# Patient Record
Sex: MALE | Race: WHITE | Employment: FULL TIME | URBAN - METROPOLITAN AREA
[De-identification: names, ages, dates, MRNs, and addresses within clinical notes are randomized per-mention and may not be internally consistent; named-entity substitution may affect disease eponyms.]

---

## 2020-03-15 ENCOUNTER — HOSPITAL ENCOUNTER (OUTPATIENT)
Age: 50
Discharge: HOME | End: 2020-03-15
Attending: EMERGENCY MEDICINE
Payer: COMMERCIAL

## 2020-03-15 VITALS
TEMPERATURE: 97 F | OXYGEN SATURATION: 96 % | HEART RATE: 77 BPM | HEIGHT: 73 IN | DIASTOLIC BLOOD PRESSURE: 58 MMHG | BODY MASS INDEX: 31.81 KG/M2 | SYSTOLIC BLOOD PRESSURE: 129 MMHG | WEIGHT: 240 LBS | RESPIRATION RATE: 20 BRPM

## 2020-03-15 DIAGNOSIS — R42 VERTIGO: Primary | ICD-10-CM

## 2020-03-15 PROCEDURE — 99203 OFFICE O/P NEW LOW 30 MIN: CPT

## 2020-03-15 PROCEDURE — 99204 OFFICE O/P NEW MOD 45 MIN: CPT

## 2020-03-15 RX ORDER — ONDANSETRON 4 MG/1
4 TABLET, ORALLY DISINTEGRATING ORAL EVERY 4 HOURS PRN
Qty: 10 TABLET | Refills: 0 | Status: SHIPPED | OUTPATIENT
Start: 2020-03-15 | End: 2020-03-22

## 2020-03-15 RX ORDER — MECLIZINE HYDROCHLORIDE 25 MG/1
25 TABLET ORAL 3 TIMES DAILY PRN
Qty: 15 TABLET | Refills: 0 | Status: SHIPPED | OUTPATIENT
Start: 2020-03-15

## 2020-03-15 NOTE — ED INITIAL ASSESSMENT (HPI)
Dizziness - today 5-6 secs feels room is spinning , then went away,  Then again 5 sec dizziness. No episode after that.  C/o nausea buit no vomiting/ pt has vertigo about 10 yrs ago

## 2020-03-15 NOTE — ED PROVIDER NOTES
Patient Seen in: [facility] In [place]      History   Patient presents with:  Dizziness    Stated Complaint: Vertigo Symptoms    HPI    24-year-old male with history of vertigo presents with dizziness.   He reports that he heard a noise las Uncorrected  Left Eye Chart Acuity: 20/40, Uncorrected    Physical Exam    General:  Vitals as listed. No acute distress   HEENT: Head atraumatic. Normal TMs and external auditory canals bilaterally. Sclerae anicteric. Conjunctivae show no pallor.   Betty Glaser Tab  Take 1 tablet (25 mg total) by mouth 3 (three) times daily as needed. Qty: 15 tablet Refills: 0    ondansetron 4 MG Oral Tablet Dispersible  Take 1 tablet (4 mg total) by mouth every 4 (four) hours as needed for Nausea.   Qty: 10 tablet Refills: 0